# Patient Record
Sex: FEMALE | Race: WHITE | Employment: FULL TIME | URBAN - METROPOLITAN AREA
[De-identification: names, ages, dates, MRNs, and addresses within clinical notes are randomized per-mention and may not be internally consistent; named-entity substitution may affect disease eponyms.]

---
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
## 2023-02-10 ENCOUNTER — HOSPITAL ENCOUNTER (EMERGENCY)
Age: 18
Discharge: HOME | End: 2023-02-10
Attending: EMERGENCY MEDICINE
Payer: COMMERCIAL

## 2023-02-10 ENCOUNTER — APPOINTMENT (OUTPATIENT)
Dept: GENERAL RADIOLOGY | Age: 18
End: 2023-02-10
Payer: COMMERCIAL

## 2023-02-10 VITALS
SYSTOLIC BLOOD PRESSURE: 136 MMHG | BODY MASS INDEX: 22.22 KG/M2 | OXYGEN SATURATION: 100 % | DIASTOLIC BLOOD PRESSURE: 77 MMHG | TEMPERATURE: 97.8 F | RESPIRATION RATE: 16 BRPM | WEIGHT: 150 LBS | HEIGHT: 69 IN | HEART RATE: 82 BPM

## 2023-02-10 DIAGNOSIS — K59.00 CONSTIPATION, UNSPECIFIED CONSTIPATION TYPE: Primary | ICD-10-CM

## 2023-02-10 LAB
BACTERIA: ABNORMAL /HPF
BILIRUBIN URINE: NEGATIVE
BLOOD, URINE: NEGATIVE
CLARITY: CLEAR
COLOR: YELLOW
GLUCOSE URINE: NEGATIVE MG/DL
HCG, URINE, POC: NEGATIVE
KETONES, URINE: NEGATIVE MG/DL
LEUKOCYTE ESTERASE, URINE: ABNORMAL
Lab: NORMAL
NEGATIVE QC PASS/FAIL: NORMAL
NITRITE, URINE: NEGATIVE
PH UA: 6 (ref 5–9)
POSITIVE QC PASS/FAIL: NORMAL
PROTEIN UA: NEGATIVE MG/DL
RBC UA: ABNORMAL /HPF (ref 0–2)
SPECIFIC GRAVITY UA: 1.02 (ref 1–1.03)
UROBILINOGEN, URINE: 0.2 E.U./DL
WBC UA: ABNORMAL /HPF (ref 0–5)

## 2023-02-10 PROCEDURE — 81001 URINALYSIS AUTO W/SCOPE: CPT

## 2023-02-10 PROCEDURE — 99284 EMERGENCY DEPT VISIT MOD MDM: CPT

## 2023-02-10 PROCEDURE — 74019 RADEX ABDOMEN 2 VIEWS: CPT

## 2023-02-10 RX ORDER — MAGNESIUM CARB/ALUMINUM HYDROX 105-160MG
296 TABLET,CHEWABLE ORAL ONCE
Qty: 296 ML | Refills: 0 | Status: SHIPPED | OUTPATIENT
Start: 2023-02-10 | End: 2023-02-10

## 2023-02-10 ASSESSMENT — ENCOUNTER SYMPTOMS
NAUSEA: 0
ABDOMINAL PAIN: 1
SORE THROAT: 0
WHEEZING: 0
SHORTNESS OF BREATH: 0
BLOOD IN STOOL: 0
EYE DISCHARGE: 0
CONSTIPATION: 1
COUGH: 0
SINUS PRESSURE: 0
PHOTOPHOBIA: 0
DIARRHEA: 0
BACK PAIN: 0
EYE REDNESS: 0
VOMITING: 0
RHINORRHEA: 0
ABDOMINAL DISTENTION: 0

## 2023-02-10 ASSESSMENT — LIFESTYLE VARIABLES
HOW MANY STANDARD DRINKS CONTAINING ALCOHOL DO YOU HAVE ON A TYPICAL DAY: PATIENT DOES NOT DRINK
HOW OFTEN DO YOU HAVE A DRINK CONTAINING ALCOHOL: NEVER

## 2023-02-10 ASSESSMENT — PAIN DESCRIPTION - LOCATION: LOCATION: ABDOMEN

## 2023-02-10 ASSESSMENT — PAIN DESCRIPTION - ORIENTATION: ORIENTATION: LOWER

## 2023-02-10 ASSESSMENT — PAIN SCALES - GENERAL: PAINLEVEL_OUTOF10: 5

## 2023-02-10 NOTE — ED NOTES
The fleet enema is given to the PT whom self administers with instructions given. The PT retains the fluid for about 5 minutes then has a small bowel movent consisting of mostly liquid with little solid fecal matter. The PT reports she still feels she is retaining stool.      Lizzy Coffey RN  02/10/23 7728

## 2023-02-10 NOTE — DISCHARGE INSTRUCTIONS
As we discussed, we recommend that you continue your home medications. We also recommend that you add on MiraLAX daily 17 g until you are experiencing relief of your symptoms and then you can reduce the amount of MiraLAX that you are taking. We have provided you a prescription for magnesium citrate for you to take for your significant constipation. With using this medication it is important that you stay well-hydrated and maintain your electrolytes. We have attached information for gastroenterologist that we suggest that you follow-up with we also recommend you follow-up with your primary care physician to follow-up on her symptoms and to follow-up on recent emergency room visit.

## 2023-02-10 NOTE — ED PROVIDER NOTES
[facility]  Department of Emergency Medicine     Written by: Thao Fortune MD  Patient Name: Bonifacio Leslie  Attending Provider: Bruna Lucas MD  Admit Date: 2/10/2023 10:10 AM  MRN: [id number]                   : 2005        Chief Complaint   Patient presents with    Constipation    - Chief complaint    HPI   Patient is a 64-year-old female with a past medical history of constipation and a surgical history of periumbilical hernia repair and surgical intervention for endometriosis presenting with constipation. Patient's symptoms have been ongoing for 2 months. Patient states that she is unable to have a bowel movement without intervention of laxatives, enemas, Linzess, and fiber supplementation. Patient has been follow-up with her primary care physician regarding her symptoms and trialed on different medications. Today, patient had worsening of her symptoms with severe abdominal cramping and bloating which prompted her visit today. Patient's last bowel movement was yesterday and was small following a Fleet enema. Patient reports that she is only able to keep the enema for 1 to 2 minutes. Patient is on oral contraceptives and does not typically have menstrual periods. She held contraceptives per her primary doctor and her last menstrual period was 3 weeks ago. Denies smoking history or alcohol history. Patient denies headache, lightheadedness, dizziness, numbness, weakness, tingling, loss of sensation, loss of consciousness, fever, cough, sore throat, chest pain, shortness of breath, nausea, vomiting, hematuria, dysuria, increasing or decreasing urinary frequency, blood in stool, diarrhea, leg pain, leg swelling, recent travel, recent illness, recent surgery, or sick contacts. Review of Systems   Constitutional:  Negative for activity change, chills, fatigue and fever. HENT:  Negative for congestion, postnasal drip, rhinorrhea, sinus pressure and sore throat.     Eyes: Negative for photophobia, discharge, redness and visual disturbance. Respiratory:  Negative for cough, shortness of breath and wheezing. Cardiovascular:  Negative for chest pain, palpitations and leg swelling. Gastrointestinal:  Positive for abdominal pain and constipation. Negative for abdominal distention, blood in stool, diarrhea, nausea and vomiting. Endocrine: Negative for cold intolerance and heat intolerance. Genitourinary:  Negative for decreased urine volume, difficulty urinating, dysuria, flank pain, frequency, hematuria and urgency. Musculoskeletal:  Negative for arthralgias, back pain, joint swelling and myalgias. Skin:  Negative for rash and wound. Allergic/Immunologic: Negative for immunocompromised state. Neurological:  Negative for dizziness, syncope, facial asymmetry, weakness, light-headedness, numbness and headaches. Hematological:  Does not bruise/bleed easily. Psychiatric/Behavioral:  Negative for behavioral problems and hallucinations. Physical Exam  Constitutional:       General: She is not in acute distress. Appearance: Normal appearance. She is not ill-appearing, toxic-appearing or diaphoretic. HENT:      Head: Normocephalic and atraumatic. Right Ear: Hearing normal.      Left Ear: Hearing normal.      Nose: Nose normal.      Mouth/Throat:      Lips: Pink. Mouth: Mucous membranes are moist.      Pharynx: Oropharynx is clear. Eyes:      Extraocular Movements: Extraocular movements intact. Conjunctiva/sclera: Conjunctivae normal.      Pupils: Pupils are equal, round, and reactive to light. Cardiovascular:      Rate and Rhythm: Normal rate and regular rhythm. Pulses: Normal pulses. Radial pulses are 2+ on the right side and 2+ on the left side. Posterior tibial pulses are 2+ on the right side and 2+ on the left side.       Heart sounds: S1 normal and S2 normal.   Pulmonary:      Effort: Pulmonary effort is normal. No tachypnea, accessory muscle usage or respiratory distress. Breath sounds: Normal breath sounds and air entry. No decreased breath sounds, wheezing, rhonchi or rales. Abdominal:      General: Abdomen is flat. Bowel sounds are normal. There is no distension. Palpations: Abdomen is soft. There is no fluid wave or mass. Tenderness: There is no abdominal tenderness. There is no right CVA tenderness, left CVA tenderness or guarding. Genitourinary:     Comments: Bedside rectal exam was performed with chaperone present. Patient has good rectal tone, there is a small amount of stool in the rectal vault that feels soft. No blood noted. Musculoskeletal:         General: No swelling or tenderness. Normal range of motion. Cervical back: Normal range of motion and neck supple. No rigidity. Right lower leg: No edema. Left lower leg: No edema. Lymphadenopathy:      Cervical: No cervical adenopathy. Skin:     General: Skin is warm and dry. Capillary Refill: Capillary refill takes less than 2 seconds. Findings: No bruising, lesion or rash. Neurological:      General: No focal deficit present. Mental Status: She is alert and oriented to person, place, and time. Mental status is at baseline. Motor: No weakness. Psychiatric:         Attention and Perception: Attention normal.         Mood and Affect: Mood and affect normal.         Behavior: Behavior is cooperative. Procedures       MDM  Number of Diagnoses or Management Options  Constipation, unspecified constipation type  Diagnosis management comments: Patient is a 47-year-old female with a past medical history of constipation and a surgical history of periumbilical hernia repair and surgical intervention for endometriosis presenting with constipation. At the time of initial evaluation, the patient is hemodynamically stable.   Differential diagnosis includes bowel obstruction, irritable bowel syndrome, or constipation. Pregnancy is negative. Urinalysis is not suggestive of urinary tract infection. Abdominal x-ray shows no evidence of bowel obstruction with diffuse colonic fecal retention. Patient was offered Fleet enema in the emergency room and would like to undergo the enema. Following the enema, patient only had a small bowel movement and is still feeling abdominal fullness. Patient was educated about her condition and demonstrated good understanding. Patient is comfortable with outpatient follow-up. She will be discharged on magnesium citrate and was told to take MiraLAX for her symptoms. Patient was given a gastroenterologist to follow-up with. At time of discharge, the patient demonstrated good understanding of her condition, had no questions, and was hemodynamically stable. ED Course as of 02/10/23 2051   Fri Feb 10, 2023   1414 Patient was reevaluated And after the Fleet enema she only had a small loose bowel movement. Patient is still experiencing significant sensation of fullness. Patient was counseled about her current bowel regimen as well as additional medications. Daily antibiotic patient is comfortable with outpatient follow-up with a gastroenterologist. [VG]      ED Course User Index  [VG] Javad Cobb MD       --------------------------------------------- PAST HISTORY ---------------------------------------------  Past Medical History:  has no past medical history on file. Past Surgical History:  has no past surgical history on file. Social History:  reports that she has never smoked. She has never used smokeless tobacco. She reports that she does not drink alcohol and does not use drugs. Family History: family history is not on file. The patients home medications have been reviewed. Allergies: Patient has no known allergies.     -------------------------------------------------- RESULTS -------------------------------------------------  Labs:  Results for orders placed or performed during the hospital encounter of 02/10/23   Urinalysis with Microscopic   Result Value Ref Range    Color, UA Yellow Straw/Yellow    Clarity, UA Clear Clear    Glucose, Ur Negative Negative mg/dL    Bilirubin Urine Negative Negative    Ketones, Urine Negative Negative mg/dL    Specific Gravity, UA 1.020 1.005 - 1.030    Blood, Urine Negative Negative    pH, UA 6.0 5.0 - 9.0    Protein, UA Negative Negative mg/dL    Urobilinogen, Urine 0.2 <2.0 E.U./dL    Nitrite, Urine Negative Negative    Leukocyte Esterase, Urine TRACE (A) Negative    WBC, UA 2-5 0 - 5 /HPF    RBC, UA 1-3 0 - 2 /HPF    Bacteria, UA FEW (A) None Seen /HPF   POC Pregnancy Urine Qual   Result Value Ref Range    HCG, Urine, POC Negative Negative    Lot Number [id number]     Positive QC Pass/Fail Pass     Negative QC Pass/Fail Pass        Radiology:  XR ABDOMEN (2 VIEWS)   Final Result   evidence of bowel obstruction. Diffuse colonic fecal retention.             ------------------------- NURSING NOTES AND VITALS REVIEWED ---------------------------  Date / Time Roomed:  2/10/2023 10:10 AM  ED Bed Assignment:  21/21    The nursing notes within the ED encounter and vital signs as below have been reviewed. /77   Pulse 82   Temp 97.8 °F (36.6 °C)   Resp 16   Ht 5' 9\" (1.753 m)   Wt 150 lb (68 kg)   SpO2 100%   BMI 22.15 kg/m²   Oxygen Saturation Interpretation: Normal      ------------------------------------------ PROGRESS NOTES ------------------------------------------  I have spoken with the patient and discussed todays results, in addition to providing specific details for the plan of care and counseling regarding the diagnosis and prognosis. Their questions are answered at this time and they are agreeable with the plan. I discussed at length with them reasons for immediate return here for re evaluation.  They will followup with primary care by calling their office tomorrow. --------------------------------- ADDITIONAL PROVIDER NOTES ---------------------------------  At this time the patient is without objective evidence of an acute process requiring hospitalization or inpatient management. They have remained hemodynamically stable throughout their entire ED visit and are stable for discharge with outpatient follow-up. The plan has been discussed in detail and they are aware of the specific conditions for emergent return, as well as the importance of follow-up. Discharge Medication List as of 2/10/2023  2:12 PM        START taking these medications    Details   Magnesium Citrate 1.745 GM/30ML solution Take 296 mLs by mouth once for 1 dose, Disp-296 mL, R-0Print             Diagnosis:  1. Constipation, unspecified constipation type        Disposition:  Patient's disposition: Discharge to home  Patient's condition is stable. Patient was seen and evaluated by myself and my attending Cyrus Polk MD. Assessment and Plan discussed with attending provider, please see attestation for final plan of care.      MD Tru Booth MD  Resident  02/10/23 [id number]

## 2023-08-28 ENCOUNTER — HOSPITAL ENCOUNTER (OUTPATIENT)
Age: 18
Discharge: HOME | End: 2023-08-30
Payer: COMMERCIAL

## 2023-08-28 ENCOUNTER — HOSPITAL ENCOUNTER (OUTPATIENT)
Dept: GENERAL RADIOLOGY | Age: 18
Discharge: HOME | End: 2023-08-30
Payer: COMMERCIAL

## 2023-08-28 DIAGNOSIS — M62.89 PELVIC FLOOR DYSFUNCTION: ICD-10-CM

## 2023-08-28 PROCEDURE — 74018 RADEX ABDOMEN 1 VIEW: CPT

## 2023-08-30 ENCOUNTER — HOSPITAL ENCOUNTER (OUTPATIENT)
Age: 18
Discharge: HOME | End: 2023-09-01
Payer: COMMERCIAL

## 2023-08-30 ENCOUNTER — HOSPITAL ENCOUNTER (OUTPATIENT)
Dept: GENERAL RADIOLOGY | Age: 18
Discharge: HOME | End: 2023-09-01
Payer: COMMERCIAL

## 2023-08-30 DIAGNOSIS — M62.89 MYOFIBROSIS: ICD-10-CM

## 2023-08-30 PROCEDURE — 74018 RADEX ABDOMEN 1 VIEW: CPT

## 2023-09-01 ENCOUNTER — HOSPITAL ENCOUNTER (OUTPATIENT)
Dept: GENERAL RADIOLOGY | Age: 18
End: 2023-09-01
Payer: COMMERCIAL

## 2023-09-01 ENCOUNTER — HOSPITAL ENCOUNTER (OUTPATIENT)
Age: 18
Discharge: HOME | End: 2023-09-01
Payer: COMMERCIAL

## 2023-09-01 DIAGNOSIS — M62.89 PELVIC FLOOR INSTABILITY: ICD-10-CM

## 2023-09-01 PROCEDURE — 74018 RADEX ABDOMEN 1 VIEW: CPT
